# Patient Record
Sex: FEMALE
[De-identification: names, ages, dates, MRNs, and addresses within clinical notes are randomized per-mention and may not be internally consistent; named-entity substitution may affect disease eponyms.]

---

## 2018-03-23 ENCOUNTER — HOSPITAL ENCOUNTER (EMERGENCY)
Dept: HOSPITAL 42 - ED | Age: 28
Discharge: HOME | End: 2018-03-23
Payer: COMMERCIAL

## 2018-03-23 VITALS
RESPIRATION RATE: 18 BRPM | OXYGEN SATURATION: 97 % | HEART RATE: 85 BPM | DIASTOLIC BLOOD PRESSURE: 75 MMHG | TEMPERATURE: 99 F | SYSTOLIC BLOOD PRESSURE: 108 MMHG

## 2018-03-23 VITALS — BODY MASS INDEX: 26.6 KG/M2
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
## 2018-03-23 DIAGNOSIS — Z23: ICD-10-CM
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
## 2018-03-23 DIAGNOSIS — S67.194A: Primary | ICD-10-CM

## 2018-03-23 NOTE — RAD
PROCEDURE:  Right Hand Radiographs.



HISTORY:

Crush Injury to 4th finger tip



COMPARISON:

None.



FINDINGS:



BONES:

There is a crush fracture of the tip of the middle finger. 



JOINTS:

Normal. No osteoarthritic changes. 



SOFT TISSUES:

Normal. 



OTHER FINDINGS:

None.



IMPRESSION:

There is a crush fracture of the tip of the middle finger.

## 2018-03-23 NOTE — ED PDOC
Arrival/HPI





- General


Time Seen by Provider: 03/23/18 00:53


Historian: Patient





- History of Present Illness


Narrative History of Present Illness (Text): 


03/23/18 00:59





A 27 year old female, with no significant past medical history, presents to the 

emergency department complaining of smashing her right ring finger in a door. 

The patient denies fevers, chills, headache, dizziness, chest pain, shortness 

of breath, dyspnea on exertion, cough, abdominal pain, nausea, vomiting, 

diarrhea, back pain, neck pain, urinary/bowel changes, or any other complaint.








Time/Duration: Prior to Arrival


Symptom Onset: Sudden


Symptom Course: Unchanged


Activities at Onset: Rest, Light


Context: Home





Past Medical History





- Provider Review


Nursing Documentation Reviewed: Yes





Family/Social History





- Physician Review


Nursing Documentation Reviewed: Yes


Family/Social History: No Known Family HX





Allergies/Home Meds


Allergies/Adverse Reactions: 


Allergies





No Known Allergies Allergy (Verified 03/23/18 01:07)


 











Review of Systems





- Physician Review


All systems were reviewed & negative as marked: Yes





- Review of Systems


Constitutional: absent: Fevers, Night Sweats


ENT: absent: Sore Throat


Respiratory: absent: SOB, Cough


Cardiovascular: absent: Chest Pain, BRICEÑO


Gastrointestinal: absent: Abdominal Pain, Constipation, Diarrhea, Nausea, 

Vomiting


Genitourinary Female: absent: Urine Output Changes


Musculoskeletal: Other (Smashed right ring finger in door. ).  absent: Back Pain

, Neck Pain


Neurological: absent: Headache, Dizziness





Physical Exam


Vital Signs Reviewed: Yes


Vital Signs











  Temp Pulse Resp BP Pulse Ox


 


 03/23/18 00:53  99.0 F  85  18  108/75  97











Temperature: Afebrile


Blood Pressure: Normal


Pulse: Regular


Respiratory Rate: Normal


Appearance: Positive for: Well-Appearing, Non-Toxic, Comfortable


Pain Distress: None


Mental Status: Positive for: Alert and Oriented X 3





- Systems Exam


Head: Present: Atraumatic, Normocephalic


Pupils: Present: PERRL


Extroacular Muscles: Present: EOMI


Conjunctiva: Present: Normal


Mouth: Present: Moist Mucous Membranes


Neck: Present: Normal Range of Motion


Respiratory/Chest: Present: Clear to Auscultation, Good Air Exchange.  No: 

Respiratory Distress, Accessory Muscle Use


Cardiovascular: Present: Regular Rate and Rhythm, Normal S1, S2.  No: Murmurs


Abdomen: Present: Normal Bowel Sounds.  No: Tenderness, Distention, Peritoneal 

Signs


Back: Present: Normal Inspection


Upper Extremity: Present: Other (Right hand, ring finger nail is elevated. Cuts 

on both sides of her finger with blood oozing from them. No subungual hematoma 

to drain. )


Lower Extremity: Present: Normal Inspection.  No: Edema


Neurological: Present: GCS=15, CN II-XII Intact, Speech Normal


Skin: Present: Warm, Dry, Normal Color.  No: Rashes


Psychiatric: Present: Alert, Oriented x 3, Normal Insight, Normal Concentration





Medical Decision Making


ED Course and Treatment: 


03/23/18 01:02





Impression:


A 27 year old female presents to the emergency department complaining of an 

injury to her right ring finger after smashing it in a door this evening.  





Plan:


-- Right Hand 4th Digit X-ray


-- Motrin, Zofran, Percocet, and Boostrix Vaccine


-- Reassess and disposition








Progress Notes:








03/23/18 02:27: Hand X-ray read and interpreted by me shows no fracture or 

dislocation. Patient will be discharged home. 











- RAD Interpretation


Radiology Orders: 








03/23/18 01:01


HAND RIGHT 3RD DIGIT (FINGER) [RAD] Stat 














- Medication Orders


Current Medication Orders: 











Discontinued Medications





Bacitracin (Bacitracin)  2 gm TOP ONCE ONE


   Stop: 03/23/18 02:20


   Last Admin: 03/23/18 02:26  Dose: 1 packet





Bandage/Support Products (Unna-Flex Elastic Unna Boot)  1 dev TOP ONCE ONE


   Stop: 03/23/18 02:19


   Last Admin: 03/23/18 02:26  Dose: 1 dev





Ibuprofen (Motrin Tab)  800 mg PO STAT STA


   Stop: 03/23/18 01:02


   Last Admin: 03/23/18 01:11  Dose: 800 mg





Ondansetron HCl (Zofran Odt)  8 mg PO STAT STA


   Stop: 03/23/18 01:02


   Last Admin: 03/23/18 01:11  Dose: 8 mg





Oxycodone/Acetaminophen (Percocet 5/325 Mg Tab)  2 tab PO STAT STA


   Stop: 03/23/18 01:02


   Last Admin: 03/23/18 01:11  Dose: 2 tab





MAR Pain Assessment


 Document     03/23/18 01:11  IT  (Rec: 03/23/18 01:11  IT  [id number])


     Pain Reassessment


      Is this a pain reassessment?               No


     Sleep


      Is patient sleeping during reassessment?   No


     Presence of Pain


      Presence of Pain                           Yes


     Pain Scale Used


      Pain Scale Used                            Numeric


     Location


      Left, Right or Bilateral                   Right


      Pain Location Body Site                    Ring Finger





Tetanus/Reduced Diphtheria/Acell Pertussis (Boostrix Vaccine Inj)  0.5 ml IM 

.ONCE ONE


   Stop: 03/23/18 01:02


   Last Admin: 03/23/18 01:11  Dose: 0.5 ml





MAR Immunization Data


 Document     03/23/18 01:11  IT  (Rec: 03/23/18 01:11  IT  [id number])


     Immunization Data


      Vaccine Information Sheet Given            Yes














- PA / NP / Resident Statement


MD/DO has reviewed & agrees with the documentation as recorded.





- Scribe Statement


The provider has reviewed the documentation as recorded by the Scribe


Marcelle Marcos 





Provider Scribe Attestation:


All medical record entries made by the Scribe were at my direction and 

personally dictated by me. I have reviewed the chart and agree that the record 

accurately reflects my personal performance of the history, physical exam, 

medical decision making, and the department course for this patient. I have 

also personally directed, reviewed, and agree with the discharge instructions 

and disposition.








Disposition/Present on Arrival





- Present on Arrival


Any Indicators Present on Arrival: No


History of DVT/PE: No


History of Uncontrolled Diabetes: No


Urinary Catheter: No


History of Decub. Ulcer: No


History Surgical Site Infection Following: None





- Disposition


Have Diagnosis and Disposition been Completed?: Yes


Diagnosis: 


 Crush injury, Injury of nail bed of finger of right hand





Disposition: HOME/ ROUTINE


Disposition Time: 02:13


Patient Plan: Discharge


Condition: GOOD


Discharge Instructions (ExitCare):  Crush Injury, Crush Injury (DC)


Additional Instructions: 


Nicolasa-





Sorry this happened to you tonight.  I know it hurts.  I did not see any broken 

bones on the x-ray.  Follow up with Dr SEAN Garcia/ [phone].  Wear the 

bandage and splint for protection.  Percocet is for really bad pain.  It will 

upset your stomach.  Zofran ODT is for your upset stomach.  If it does not hurt 

all that bad, take motrin.  Return to us if any problems.








Best-


Dr. Edmond Fuentes





Prescriptions: 


Ondansetron ODT [Zofran ODT] 8 mg PO TID #30 odt


oxyCODONE/Acetaminophen [Percocet 5/325 mg Tab] 1 ea PO QID #20 tab


Forms:  [facility] (English)